# Patient Record
Sex: MALE | Race: WHITE
[De-identification: names, ages, dates, MRNs, and addresses within clinical notes are randomized per-mention and may not be internally consistent; named-entity substitution may affect disease eponyms.]

---

## 2019-04-02 ENCOUNTER — HOSPITAL ENCOUNTER (EMERGENCY)
Dept: HOSPITAL 47 - EC | Age: 24
Discharge: HOME | End: 2019-04-02
Payer: COMMERCIAL

## 2019-04-02 VITALS — HEART RATE: 82 BPM | RESPIRATION RATE: 16 BRPM | SYSTOLIC BLOOD PRESSURE: 127 MMHG | DIASTOLIC BLOOD PRESSURE: 77 MMHG

## 2019-04-02 VITALS — TEMPERATURE: 98.9 F
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
## 2019-04-02 DIAGNOSIS — S99.911A: Primary | ICD-10-CM
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
## 2019-04-02 PROCEDURE — 99283 EMERGENCY DEPT VISIT LOW MDM: CPT

## 2019-04-02 NOTE — XR
EXAM:

  XR Right Foot Complete, 3 or More Views

 

CLINICAL HISTORY:

  ITS.REASON XR Reason: Pain

 

TECHNIQUE:

  Frontal, lateral and oblique views of the right foot.

 

COMPARISON:

  None available

 

FINDINGS:

  Bones/joints:  No evidence of fracture or dislocation.  No significant 

arthritic changes.  No bony erosions identified.

  Soft tissues:  No radiopaque foreign bodies.

 

IMPRESSION:     

  No evidence of fracture or dislocation.

 

  No acute bone or joint abnormalities.

## 2019-04-02 NOTE — XR
EXAMINATION TYPE: XR ankle complete RT

 

DATE OF EXAM: 4/2/2019

 

COMPARISON: NONE

 

HISTORY: Twisted ankle. Pain

 

TECHNIQUE: 3 views

 

FINDINGS: There is soft tissue swelling over lateral malleolus. Ankle mortise is anatomic. Joint spac
es are normal.

 

IMPRESSION: Soft tissue swelling. No fracture seen.

## 2019-04-02 NOTE — ED
General Adult HPI





- General


Chief complaint: Extremity Injury, Lower


Stated complaint: Ankle injury


Time Seen by Provider: 04/02/19 21:48


Source: patient, RN notes reviewed


Mode of arrival: ambulatory


Limitations: no limitations





- History of Present Illness


Initial comments: 





23-year-old male presents for right ankle pain after twisting his ankle today.  

Patient states he accidentally inverted his right ankle when walking.  States it

is painful to walk on at this time.  Also admits to right forefoot pain.  Denies

any other injuries.  Denies hitting his head. Patient has no other complaints at

this time including shortness of breath, chest pain, abdominal pain, nausea or 

vomiting, headache, or visual changes.





- Related Data


                                Home Medications











 Medication  Instructions  Recorded  Confirmed


 


No Known Home Medications  04/02/19 04/02/19











                                    Allergies











Allergy/AdvReac Type Severity Reaction Status Date / Time


 


No Known Allergies Allergy   Verified 04/02/19 21:42














Review of Systems


ROS Statement: 


Those systems with pertinent positive or pertinent negative responses have been 

documented in the HPI.





ROS Other: All systems not noted in ROS Statement are negative.





Past Medical History


Past Medical History: No Reported History


History of Any Multi-Drug Resistant Organisms: None Reported


Past Surgical History: No Surgical Hx Reported


Past Psychological History: No Psychological Hx Reported


Smoking Status: Never smoker


Past Alcohol Use History: None Reported


Past Drug Use History: None Reported





General Exam


Limitations: no limitations


General appearance: alert, in no apparent distress


Head exam: Present: atraumatic, normocephalic, normal inspection


Eye exam: Present: normal appearance, PERRL, EOMI.  Absent: scleral icterus, 

conjunctival injection, periorbital swelling


ENT exam: Present: normal exam, normal oropharynx, mucous membranes moist, TM's 

normal bilaterally, normal external ear exam


Neck exam: Present: normal inspection, full ROM.  Absent: tenderness, 

meningismus, lymphadenopathy


Respiratory exam: Present: normal lung sounds bilaterally.  Absent: respiratory 

distress, wheezes, rales, rhonchi, stridor


Cardiovascular Exam: Present: regular rate, normal rhythm, normal heart sounds. 

Absent: systolic murmur, diastolic murmur, rubs, gallop, clicks


GI/Abdominal exam: Present: soft, normal bowel sounds.  Absent: distended, 

tenderness, guarding, rebound, rigid


Extremities exam: Present: tenderness (Tenderness over the generalized right 

forefoot as well as the lateral malleolus), normal capillary refill (Capillary 

refill less than 2 seconds, DP pulse 2+ in the right lower extremity), joint 

swelling (Patient does have edema noted of the right lateral malleolus), other 

(Sensation intact in the right lower extremity).  Absent: full ROM (Patient has 

limited plantar and dorsiflexion of the right foot but is able to do this 

partially)


Neurological exam: Present: alert, oriented X3, CN II-XII intact


Psychiatric exam: Present: normal affect, normal mood





Course


                                   Vital Signs











  04/02/19 04/02/19





  21:40 22:42


 


Temperature 98.9 F 


 


Pulse Rate 107 H 82


 


Respiratory 20 16





Rate  


 


Blood Pressure 133/78 127/77


 


O2 Sat by Pulse 99 97





Oximetry  














Procedures





- Orthopedic Splinting/Casting


  ** Injury #1


Side: right


Lower Extremity Injury Location: ankle


Lower Extremity Immobilizer: AirCast


Other Orthopedic Equipment: crutches


Additional Comments: 





Neurovascular status intact after splint applied





Medical Decision Making





- Medical Decision Making





23-year-old male presents to the emergency department for a chief complaint of 

right ankle pain.  Patient does have edema noted of the right lateral malleolus 

with minimal forefoot tenderness.  X-rays of the foot and ankle are negative for

fractures.  Patient was splinted in an Aircast.  Patient will follow up with 

orthopedics in one to 2 days and will return here if he has any worsening 

symptoms.  Educated on rice therapy and use of crutches.





Disposition


Clinical Impression: 


 Injury of ankle





Disposition: HOME SELF-CARE


Condition: Good


Instructions (If sedation given, give patient instructions):  Ankle Sprain (ED)


Additional Instructions: 


Take Motrin and Tylenol for pain.  Rest ice and elevate the right foot.  Use 

crutches.  Follow-up with orthopedics in one to 2 days.  Please return here to 

the emergency department if you have any worsening symptoms.


Is patient prescribed a controlled substance at d/c from ED?: No


Referrals: 


Kodi Salazar MD [REFERRING] - 1-2 days


Des Feliciano MD [STAFF PHYSICIAN] - 1-2 days


Time of Disposition: 23:15

## 2020-04-25 ENCOUNTER — HOSPITAL ENCOUNTER (EMERGENCY)
Dept: HOSPITAL 47 - EC | Age: 25
Discharge: HOME | End: 2020-04-25
Payer: COMMERCIAL

## 2020-04-25 VITALS
TEMPERATURE: 99.9 F | DIASTOLIC BLOOD PRESSURE: 76 MMHG | HEART RATE: 113 BPM | RESPIRATION RATE: 20 BRPM | SYSTOLIC BLOOD PRESSURE: 132 MMHG

## 2020-04-25 DIAGNOSIS — U07.1: Primary | ICD-10-CM

## 2020-04-25 DIAGNOSIS — R07.89: ICD-10-CM

## 2020-04-25 LAB
ALBUMIN SERPL-MCNC: 5.1 G/DL (ref 3.5–5)
ALP SERPL-CCNC: 82 U/L (ref 38–126)
ALT SERPL-CCNC: 31 U/L (ref 4–49)
ANION GAP SERPL CALC-SCNC: 11 MMOL/L
APTT BLD: 22.8 SEC (ref 22–30)
AST SERPL-CCNC: 28 U/L (ref 17–59)
BASOPHILS # BLD AUTO: 0 K/UL (ref 0–0.2)
BASOPHILS NFR BLD AUTO: 0 %
BUN SERPL-SCNC: 14 MG/DL (ref 9–20)
CALCIUM SPEC-MCNC: 10.4 MG/DL (ref 8.4–10.2)
CHLORIDE SERPL-SCNC: 101 MMOL/L (ref 98–107)
CO2 SERPL-SCNC: 29 MMOL/L (ref 22–30)
D DIMER PPP FEU-MCNC: <0.17 MG/L FEU (ref ?–0.6)
EOSINOPHIL # BLD AUTO: 0 K/UL (ref 0–0.7)
EOSINOPHIL NFR BLD AUTO: 1 %
ERYTHROCYTE [DISTWIDTH] IN BLOOD BY AUTOMATED COUNT: 5.39 M/UL (ref 4.3–5.9)
ERYTHROCYTE [DISTWIDTH] IN BLOOD: 12.1 % (ref 11.5–15.5)
GLUCOSE SERPL-MCNC: 111 MG/DL (ref 74–99)
HCT VFR BLD AUTO: 45.4 % (ref 39–53)
HGB BLD-MCNC: 15.3 GM/DL (ref 13–17.5)
INR PPP: 1.1 (ref ?–1.2)
LYMPHOCYTES # SPEC AUTO: 0.8 K/UL (ref 1–4.8)
LYMPHOCYTES NFR SPEC AUTO: 13 %
MAGNESIUM SPEC-SCNC: 2.2 MG/DL (ref 1.6–2.3)
MCH RBC QN AUTO: 28.3 PG (ref 25–35)
MCHC RBC AUTO-ENTMCNC: 33.7 G/DL (ref 31–37)
MCV RBC AUTO: 84.2 FL (ref 80–100)
MONOCYTES # BLD AUTO: 0.3 K/UL (ref 0–1)
MONOCYTES NFR BLD AUTO: 5 %
NEUTROPHILS # BLD AUTO: 4.9 K/UL (ref 1.3–7.7)
NEUTROPHILS NFR BLD AUTO: 80 %
PLATELET # BLD AUTO: 175 K/UL (ref 150–450)
POTASSIUM SERPL-SCNC: 3.9 MMOL/L (ref 3.5–5.1)
PROT SERPL-MCNC: 7.7 G/DL (ref 6.3–8.2)
PT BLD: 11.4 SEC (ref 9–12)
SODIUM SERPL-SCNC: 141 MMOL/L (ref 137–145)
WBC # BLD AUTO: 6.1 K/UL (ref 3.8–10.6)

## 2020-04-25 PROCEDURE — 85025 COMPLETE CBC W/AUTO DIFF WBC: CPT

## 2020-04-25 PROCEDURE — 83735 ASSAY OF MAGNESIUM: CPT

## 2020-04-25 PROCEDURE — 93005 ELECTROCARDIOGRAM TRACING: CPT

## 2020-04-25 PROCEDURE — 84484 ASSAY OF TROPONIN QUANT: CPT

## 2020-04-25 PROCEDURE — 36415 COLL VENOUS BLD VENIPUNCTURE: CPT

## 2020-04-25 PROCEDURE — 87635 SARS-COV-2 COVID-19 AMP PRB: CPT

## 2020-04-25 PROCEDURE — 85730 THROMBOPLASTIN TIME PARTIAL: CPT

## 2020-04-25 PROCEDURE — 85610 PROTHROMBIN TIME: CPT

## 2020-04-25 PROCEDURE — 85379 FIBRIN DEGRADATION QUANT: CPT

## 2020-04-25 PROCEDURE — 99285 EMERGENCY DEPT VISIT HI MDM: CPT

## 2020-04-25 PROCEDURE — 71045 X-RAY EXAM CHEST 1 VIEW: CPT

## 2020-04-25 PROCEDURE — 80053 COMPREHEN METABOLIC PANEL: CPT

## 2020-04-25 NOTE — XR
EXAMINATION TYPE: XR chest 1V portable

 

DATE OF EXAM: 4/25/2020

 

Comparison: None

 

Clinical History: 24-year-old male COUGH/SOB

 

Findings:

Heart normal size. Aorta and pulmonary vasculature within normal limits. Hazy peripheral midlung dens
ities related to overlying soft tissue. No consolidation or pleural effusion.

 

 

Impression:

 No acute cardiopulmonary process.

## 2020-04-25 NOTE — ED
URI HPI





- General


Chief Complaint: Upper Respiratory Infection


Stated Complaint: Chest pain


Time Seen by Provider: 04/25/20 16:04


Source: patient


Mode of arrival: ambulatory


Limitations: no limitations





- History of Present Illness


Initial Comments: 





Patient is a 24-year-old male presenting to the emergency Department with 

complaints of chest discomfort for 2 days.  Patient states he lives with his 

father who just had a positive for Covid.  Patient is also experiencing a fever,

shortness of breath.  He states his symptoms started 2 days ago.  He denies any 

abdominal pain, nausea, vomiting, diarrhea.  He states the chest pain is 

intermittent and increases when he is coughing.  He denies history of PE.  He 

has not been taking any Tylenol.  Patient states he took Robitussin this 

morning.  He has no other complaints.  Upon arrival to the ER, patient's 

temperatures 100.4, pulse is 137, respiratory rate 24, BP and oxygen are normal.





- Related Data


                                  Previous Rx's











 Medication  Instructions  Recorded


 


Azithromycin [Zithromax Z-pack] 250 mg PO AS DIRECTED #1 pack 04/27/20











                                    Allergies











Allergy/AdvReac Type Severity Reaction Status Date / Time


 


No Known Allergies Allergy   Verified 04/27/20 03:18














Review of Systems


ROS Statement: 


Those systems with pertinent positive or pertinent negative responses have been 

documented in the HPI.





ROS Other: All systems not noted in ROS Statement are negative.





Past Medical History


Past Medical History: No Reported History


History of Any Multi-Drug Resistant Organisms: None Reported


Past Surgical History: No Surgical Hx Reported


Past Psychological History: No Psychological Hx Reported


Smoking Status: Never smoker


Past Alcohol Use History: None Reported


Past Drug Use History: None Reported





General Exam





- General Exam Comments


Initial Comments: 





GENERAL: 


Well-appearing, well-nourished and in no acute distress.





HEAD: 


Atraumatic, normocephalic.





EYES:


Pupils equal round and reactive to light, extraocular movements intact, sclera 

anicteric, conjunctiva are normal.





ENT: 


TMs normal, nares patent, oropharynx clear without exudates.  Moist mucous 

membranes.





NECK: 


Normal range of motion, supple without lymphadenopathy or JVD.





LUNGS:


 Breath sounds clear to auscultation bilaterally and equal.  No wheezes rales or

rhonchi.





HEART:


Tachycardia rate and rhythm without murmurs, rubs or gallops.





ABDOMEN: 


Soft, nontender, normoactive bowel sounds.  No guarding, no rebound.  No masses 

appreciated.





: Deferred 





EXTREMITIES: 


Normal range of motion, no pitting or edema.  No clubbing or cyanosis.





NEUROLOGICAL: 


Cranial nerves II through XII grossly intact.  Normal speech, normal gait.





PSYCH:


Normal mood, normal affect.





SKIN:


 Warm, Dry, normal turgor, no rashes or lesions noted.


Limitations: no limitations





Course


                                   Vital Signs











  04/25/20 04/25/20 04/25/20





  15:09 15:44 16:56


 


Temperature 98.9 F 100.4 F H 


 


Pulse Rate 148 H 137 H 132 H


 


Respiratory 20 24 24





Rate   


 


Blood Pressure 140/80 137/89 141/63


 


O2 Sat by Pulse 100 98 98





Oximetry   














  04/25/20





  17:59


 


Temperature 99.9 F H


 


Pulse Rate 113 H


 


Respiratory 20





Rate 


 


Blood Pressure 132/76


 


O2 Sat by Pulse 98





Oximetry 














Medical Decision Making





- Medical Decision Making





Patient is 24-year-old male presenting with chest discomfort and fever 2 days. 

Patient is currently living with father who just has a positive for Covid.  

Patient presented febrile to 100.4, tachycardia at 137, respirations normal.  

EKG shows tachycardia, no other acute process.  Chest x-ray reveals no acute 

process.  Labs shows no acute findings, d-dimer was negative.  Troponin is 

normal.  Coronavirus was detected.  Patient was given Tylenol, fluids.  I discu

ssed these findings with the patient.  Patient is stable for discharge, he is 

feeling improvement, 98% on room air.  I discussed with patient to continue to 

self isolate, wear a mask.  Patient is in agreement with this plan of care.  

He'll continue with Tylenol as needed for fever control.  Return parameters were

discussed with the patient he verbalizes understanding.  Case discussed with Dr. Lindsay. 





- Lab Data


Result diagrams: 


                                 04/25/20 16:00





                                 04/25/20 16:00


                                   Lab Results











  04/25/20 04/25/20 04/25/20 Range/Units





  16:00 16:00 16:00 


 


WBC  6.1    (3.8-10.6)  k/uL


 


RBC  5.39    (4.30-5.90)  m/uL


 


Hgb  15.3    (13.0-17.5)  gm/dL


 


Hct  45.4    (39.0-53.0)  %


 


MCV  84.2    (80.0-100.0)  fL


 


MCH  28.3    (25.0-35.0)  pg


 


MCHC  33.7    (31.0-37.0)  g/dL


 


RDW  12.1    (11.5-15.5)  %


 


Plt Count  175    (150-450)  k/uL


 


Neutrophils %  80    %


 


Lymphocytes %  13    %


 


Monocytes %  5    %


 


Eosinophils %  1    %


 


Basophils %  0    %


 


Neutrophils #  4.9    (1.3-7.7)  k/uL


 


Lymphocytes #  0.8 L    (1.0-4.8)  k/uL


 


Monocytes #  0.3    (0-1.0)  k/uL


 


Eosinophils #  0.0    (0-0.7)  k/uL


 


Basophils #  0.0    (0-0.2)  k/uL


 


PT   11.4   (9.0-12.0)  sec


 


INR   1.1   (<1.2)  


 


APTT   22.8   (22.0-30.0)  sec


 


D-Dimer   <0.17   (<0.60)  mg/L FEU


 


Sodium    141  (137-145)  mmol/L


 


Potassium    3.9  (3.5-5.1)  mmol/L


 


Chloride    101  ()  mmol/L


 


Carbon Dioxide    29  (22-30)  mmol/L


 


Anion Gap    11  mmol/L


 


BUN    14  (9-20)  mg/dL


 


Creatinine    0.79  (0.66-1.25)  mg/dL


 


Est GFR (CKD-EPI)AfAm    >90  (>60 ml/min/1.73 sqM)  


 


Est GFR (CKD-EPI)NonAf    >90  (>60 ml/min/1.73 sqM)  


 


Glucose    111 H  (74-99)  mg/dL


 


Calcium    10.4 H  (8.4-10.2)  mg/dL


 


Magnesium    2.2  (1.6-2.3)  mg/dL


 


Total Bilirubin    0.5  (0.2-1.3)  mg/dL


 


AST    28  (17-59)  U/L


 


ALT    31  (4-49)  U/L


 


Alkaline Phosphatase    82  ()  U/L


 


Troponin I     (0.000-0.034)  ng/mL


 


Total Protein    7.7  (6.3-8.2)  g/dL


 


Albumin    5.1 H  (3.5-5.0)  g/dL


 


Coronavirus (PCR)     (Not Detectd)  














  04/25/20 04/25/20 Range/Units





  16:00 16:00 


 


WBC    (3.8-10.6)  k/uL


 


RBC    (4.30-5.90)  m/uL


 


Hgb    (13.0-17.5)  gm/dL


 


Hct    (39.0-53.0)  %


 


MCV    (80.0-100.0)  fL


 


MCH    (25.0-35.0)  pg


 


MCHC    (31.0-37.0)  g/dL


 


RDW    (11.5-15.5)  %


 


Plt Count    (150-450)  k/uL


 


Neutrophils %    %


 


Lymphocytes %    %


 


Monocytes %    %


 


Eosinophils %    %


 


Basophils %    %


 


Neutrophils #    (1.3-7.7)  k/uL


 


Lymphocytes #    (1.0-4.8)  k/uL


 


Monocytes #    (0-1.0)  k/uL


 


Eosinophils #    (0-0.7)  k/uL


 


Basophils #    (0-0.2)  k/uL


 


PT    (9.0-12.0)  sec


 


INR    (<1.2)  


 


APTT    (22.0-30.0)  sec


 


D-Dimer    (<0.60)  mg/L FEU


 


Sodium    (137-145)  mmol/L


 


Potassium    (3.5-5.1)  mmol/L


 


Chloride    ()  mmol/L


 


Carbon Dioxide    (22-30)  mmol/L


 


Anion Gap    mmol/L


 


BUN    (9-20)  mg/dL


 


Creatinine    (0.66-1.25)  mg/dL


 


Est GFR (CKD-EPI)AfAm    (>60 ml/min/1.73 sqM)  


 


Est GFR (CKD-EPI)NonAf    (>60 ml/min/1.73 sqM)  


 


Glucose    (74-99)  mg/dL


 


Calcium    (8.4-10.2)  mg/dL


 


Magnesium    (1.6-2.3)  mg/dL


 


Total Bilirubin    (0.2-1.3)  mg/dL


 


AST    (17-59)  U/L


 


ALT    (4-49)  U/L


 


Alkaline Phosphatase    ()  U/L


 


Troponin I  <0.012   (0.000-0.034)  ng/mL


 


Total Protein    (6.3-8.2)  g/dL


 


Albumin    (3.5-5.0)  g/dL


 


Coronavirus (PCR)   Detected A  (Not Detectd)  














- EKG Data


EKG Comments: 





Ventricular rate 119, MT interval 156, .  Sinus tachycardia, possible 

left atrial enlargement.  no signs of acute ischemia.





Disposition


Clinical Impression: 


 COVID-19 virus infection, Atypical chest pain





Disposition: HOME SELF-CARE


Condition: Stable


Instructions (If sedation given, give patient instructions):  Upper Respiratory 

Infection (ED)


Additional Instructions: 


Please return to the Emergency Department if symptoms worsen or any other 

concerns.


Continue with Tylenol as needed for fever control.  


Continue to increase fluid intake.


Continue to wear a mask at home.  Continue to self quarantine to prevent the 

virus spread.


Is patient prescribed a controlled substance at d/c from ED?: No


Referrals: 


None,Stated [Primary Care Provider] - 1-2 days

## 2020-04-27 ENCOUNTER — HOSPITAL ENCOUNTER (EMERGENCY)
Dept: HOSPITAL 47 - EC | Age: 25
Discharge: HOME | End: 2020-04-27
Payer: COMMERCIAL

## 2020-04-27 VITALS — SYSTOLIC BLOOD PRESSURE: 147 MMHG | TEMPERATURE: 98.6 F | DIASTOLIC BLOOD PRESSURE: 94 MMHG

## 2020-04-27 VITALS — HEART RATE: 93 BPM

## 2020-04-27 VITALS — RESPIRATION RATE: 18 BRPM

## 2020-04-27 DIAGNOSIS — R07.89: ICD-10-CM

## 2020-04-27 DIAGNOSIS — U07.1: Primary | ICD-10-CM

## 2020-04-27 LAB
ALBUMIN SERPL-MCNC: 5.2 G/DL (ref 3.5–5)
ALP SERPL-CCNC: 85 U/L (ref 38–126)
ALT SERPL-CCNC: 27 U/L (ref 4–49)
ANION GAP SERPL CALC-SCNC: 9 MMOL/L
AST SERPL-CCNC: 28 U/L (ref 17–59)
BASOPHILS # BLD AUTO: 0 K/UL (ref 0–0.2)
BASOPHILS NFR BLD AUTO: 0 %
BUN SERPL-SCNC: 9 MG/DL (ref 9–20)
CALCIUM SPEC-MCNC: 10 MG/DL (ref 8.4–10.2)
CHLORIDE SERPL-SCNC: 100 MMOL/L (ref 98–107)
CO2 SERPL-SCNC: 27 MMOL/L (ref 22–30)
EOSINOPHIL # BLD AUTO: 0.1 K/UL (ref 0–0.7)
EOSINOPHIL NFR BLD AUTO: 1 %
ERYTHROCYTE [DISTWIDTH] IN BLOOD BY AUTOMATED COUNT: 5.31 M/UL (ref 4.3–5.9)
ERYTHROCYTE [DISTWIDTH] IN BLOOD: 12 % (ref 11.5–15.5)
GLUCOSE SERPL-MCNC: 96 MG/DL (ref 74–99)
HCT VFR BLD AUTO: 43.5 % (ref 39–53)
HGB BLD-MCNC: 15 GM/DL (ref 13–17.5)
LYMPHOCYTES # SPEC AUTO: 1.4 K/UL (ref 1–4.8)
LYMPHOCYTES NFR SPEC AUTO: 25 %
MCH RBC QN AUTO: 28.1 PG (ref 25–35)
MCHC RBC AUTO-ENTMCNC: 34.3 G/DL (ref 31–37)
MCV RBC AUTO: 81.9 FL (ref 80–100)
MONOCYTES # BLD AUTO: 0.4 K/UL (ref 0–1)
MONOCYTES NFR BLD AUTO: 7 %
NEUTROPHILS # BLD AUTO: 3.6 K/UL (ref 1.3–7.7)
NEUTROPHILS NFR BLD AUTO: 64 %
PLATELET # BLD AUTO: 174 K/UL (ref 150–450)
POTASSIUM SERPL-SCNC: 3.6 MMOL/L (ref 3.5–5.1)
PROT SERPL-MCNC: 8.1 G/DL (ref 6.3–8.2)
SODIUM SERPL-SCNC: 136 MMOL/L (ref 137–145)
WBC # BLD AUTO: 5.6 K/UL (ref 3.8–10.6)

## 2020-04-27 PROCEDURE — 85379 FIBRIN DEGRADATION QUANT: CPT

## 2020-04-27 PROCEDURE — 99284 EMERGENCY DEPT VISIT MOD MDM: CPT

## 2020-04-27 PROCEDURE — 36415 COLL VENOUS BLD VENIPUNCTURE: CPT

## 2020-04-27 PROCEDURE — 80053 COMPREHEN METABOLIC PANEL: CPT

## 2020-04-27 PROCEDURE — 71045 X-RAY EXAM CHEST 1 VIEW: CPT

## 2020-04-27 PROCEDURE — 85025 COMPLETE CBC W/AUTO DIFF WBC: CPT

## 2020-04-27 PROCEDURE — 93005 ELECTROCARDIOGRAM TRACING: CPT

## 2020-04-27 NOTE — ED
General Adult HPI





- General


Chief complaint: Upper Respiratory Infection


Stated complaint: cough


Source: patient


Mode of arrival: ambulatory


Limitations: no limitations





- History of Present Illness


Initial comments: 





The patient is a 24-year-old male with no past medical history of presents to 

the emergency department with reported continued cough and chest pain.  The 

patient was seen in the emergency department 2 days ago for similar complaint 

and was diagnosed Covid.  He does look his father who was diagnosed positive.  

He states that he has been taking 1000 mg of Tylenol every 6 hours for his 

fever.  He is also taken Robitussin.  He reports to having continued body aches,

sore throat and cough.  He denies any worsening shortness of breath.  He did 

have an episode of coughing today for which she had some blood streaking in his 

mucus.  He denies a history of DVT or PE.  No calf pain or swelling.  He denies 

any nausea or vomiting.  No abdominal pain.  There are no other alleviating, 

precipitating or modifying factors





- Related Data


                                  Previous Rx's











 Medication  Instructions  Recorded


 


Azithromycin [Zithromax Z-pack] 250 mg PO AS DIRECTED #1 pack 04/27/20











                                    Allergies











Allergy/AdvReac Type Severity Reaction Status Date / Time


 


No Known Allergies Allergy   Verified 04/27/20 03:18














Review of Systems


ROS Statement: 


Those systems with pertinent positive or pertinent negative responses have been 

documented in the HPI.





ROS Other: All systems not noted in ROS Statement are negative.





Past Medical History


Past Medical History: No Reported History


History of Any Multi-Drug Resistant Organisms: None Reported


Past Surgical History: No Surgical Hx Reported


Past Psychological History: No Psychological Hx Reported


Smoking Status: Never smoker


Past Alcohol Use History: None Reported


Past Drug Use History: None Reported





General Exam


Limitations: no limitations





Course


                                   Vital Signs











  04/27/20 04/27/20 04/27/20





  03:13 03:42 05:07


 


Temperature 98.6 F  


 


Pulse Rate 120 H  93


 


Respiratory 20 18 18





Rate   


 


Blood Pressure 147/94  


 


O2 Sat by Pulse 100  98





Oximetry   














EKG Findings





- EKG Comments:


EKG Findings:: EKG demonstrates a normal sinus rhythm with a ventricular rate of

98.  W[place] interval 136.  QRS 82.  QTC of 434.  No acute ST segment elevations or 

depressions concerning for ischemic changes





Medical Decision Making





- Medical Decision Making





Upon the patient's placed into room 7.  The patient is tachycardic however he is

oxygenating percent on room air.  No increased work of breathing.  I did 

recommend repeating laboratory studies and a chest x-ray for which the patient 

did agree.  Hemoglobin is 15.  D-dimer is less than 0.17.  Chest x-ray 

demonstrates no acute process.  I discussed results with the patient.  He is 

reevaluated and his tachycardia has improved.  He continues to saturate 97% with

no increased worker breathing.  I discussed diagnosis, differential treatment 

options.  The patient was given a dose of azithromycin in the emergency room.  I

will prescribe the patient azithromycin at home.  He is to follow up with his 

primary care doctor for further management.  Return to the emergency department 

for any new worsening symptoms.  The patient was in agreement treatment plan and

discharged home stable condition





- Lab Data


Result diagrams: 


                                 04/27/20 03:38





                                 04/27/20 03:38


                                   Lab Results











  04/27/20 04/27/20 04/27/20 Range/Units





  03:38 03:38 03:38 


 


WBC  5.6    (3.8-10.6)  k/uL


 


RBC  5.31    (4.30-5.90)  m/uL


 


Hgb  15.0    (13.0-17.5)  gm/dL


 


Hct  43.5    (39.0-53.0)  %


 


MCV  81.9    (80.0-100.0)  fL


 


MCH  28.1    (25.0-35.0)  pg


 


MCHC  34.3    (31.0-37.0)  g/dL


 


RDW  12.0    (11.5-15.5)  %


 


Plt Count  174    (150-450)  k/uL


 


Neutrophils %  64    %


 


Lymphocytes %  25    %


 


Monocytes %  7    %


 


Eosinophils %  1    %


 


Basophils %  0    %


 


Neutrophils #  3.6    (1.3-7.7)  k/uL


 


Lymphocytes #  1.4    (1.0-4.8)  k/uL


 


Monocytes #  0.4    (0-1.0)  k/uL


 


Eosinophils #  0.1    (0-0.7)  k/uL


 


Basophils #  0.0    (0-0.2)  k/uL


 


D-Dimer   <0.17   (<0.60)  mg/L FEU


 


Sodium    136 L  (137-145)  mmol/L


 


Potassium    3.6  (3.5-5.1)  mmol/L


 


Chloride    100  ()  mmol/L


 


Carbon Dioxide    27  (22-30)  mmol/L


 


Anion Gap    9  mmol/L


 


BUN    9  (9-20)  mg/dL


 


Creatinine    0.74  (0.66-1.25)  mg/dL


 


Est GFR (CKD-EPI)AfAm    >90  (>60 ml/min/1.73 sqM)  


 


Est GFR (CKD-EPI)NonAf    >90  (>60 ml/min/1.73 sqM)  


 


Glucose    96  (74-99)  mg/dL


 


Calcium    10.0  (8.4-10.2)  mg/dL


 


Total Bilirubin    0.7  (0.2-1.3)  mg/dL


 


AST    28  (17-59)  U/L


 


ALT    27  (4-49)  U/L


 


Alkaline Phosphatase    85  ()  U/L


 


Total Protein    8.1  (6.3-8.2)  g/dL


 


Albumin    5.2 H  (3.5-5.0)  g/dL














Disposition


Clinical Impression: 


 COVID-19 virus infection, Atypical chest pain





Disposition: HOME SELF-CARE


Condition: Stable


Instructions (If sedation given, give patient instructions):  Noncardiac Chest 

Pain (ED)


Additional Instructions: 


Please follow-up with your primary care doctor in 2-4 days.  Return to the 

emergency room for any new or worsening symptoms.  I do recommend you take your 

pulse ox daily.  Quarantine yourself in order to prevent the spread of the 

infection


Prescriptions: 


Azithromycin [Zithromax Z-pack] 250 mg PO AS DIRECTED #1 pack


Is patient prescribed a controlled substance at d/c from ED?: No


Referrals: 


None,Stated [Primary Care Provider] - 1-2 days


Time of Disposition: 05:02

## 2020-04-27 NOTE — XR
EXAMINATION TYPE: XR chest 1V portable

 

DATE OF EXAM: 4/27/2020

 

COMPARISON: 4/25/2020

 

HISTORY: Chest pain

 

TECHNIQUE: Single view

 

FINDINGS: Heart and mediastinum are normal. Lungs are clear. Diaphragm is normal. Bony thorax appears
 normal.

 

IMPRESSION: Normal chest. No change.

## 2020-05-04 ENCOUNTER — HOSPITAL ENCOUNTER (EMERGENCY)
Dept: HOSPITAL 47 - EC | Age: 25
Discharge: HOME | End: 2020-05-04
Payer: COMMERCIAL

## 2020-05-04 VITALS
SYSTOLIC BLOOD PRESSURE: 136 MMHG | RESPIRATION RATE: 18 BRPM | TEMPERATURE: 98.3 F | DIASTOLIC BLOOD PRESSURE: 79 MMHG | HEART RATE: 90 BPM

## 2020-05-04 DIAGNOSIS — U07.1: Primary | ICD-10-CM

## 2020-05-04 DIAGNOSIS — R07.89: ICD-10-CM

## 2020-05-04 LAB
ALBUMIN SERPL-MCNC: 5.2 G/DL (ref 3.5–5)
ALP SERPL-CCNC: 73 U/L (ref 38–126)
ALT SERPL-CCNC: 20 U/L (ref 4–49)
ANION GAP SERPL CALC-SCNC: 10 MMOL/L
AST SERPL-CCNC: 29 U/L (ref 17–59)
BASOPHILS # BLD AUTO: 0 K/UL (ref 0–0.2)
BASOPHILS NFR BLD AUTO: 0 %
BUN SERPL-SCNC: 10 MG/DL (ref 9–20)
CALCIUM SPEC-MCNC: 10.2 MG/DL (ref 8.4–10.2)
CHLORIDE SERPL-SCNC: 99 MMOL/L (ref 98–107)
CO2 SERPL-SCNC: 30 MMOL/L (ref 22–30)
EOSINOPHIL # BLD AUTO: 0.1 K/UL (ref 0–0.7)
EOSINOPHIL NFR BLD AUTO: 2 %
ERYTHROCYTE [DISTWIDTH] IN BLOOD BY AUTOMATED COUNT: 5.31 M/UL (ref 4.3–5.9)
ERYTHROCYTE [DISTWIDTH] IN BLOOD: 12.1 % (ref 11.5–15.5)
GLUCOSE SERPL-MCNC: 113 MG/DL (ref 74–99)
HCT VFR BLD AUTO: 44.6 % (ref 39–53)
HGB BLD-MCNC: 14.9 GM/DL (ref 13–17.5)
LYMPHOCYTES # SPEC AUTO: 1 K/UL (ref 1–4.8)
LYMPHOCYTES NFR SPEC AUTO: 22 %
MCH RBC QN AUTO: 28.1 PG (ref 25–35)
MCHC RBC AUTO-ENTMCNC: 33.5 G/DL (ref 31–37)
MCV RBC AUTO: 84 FL (ref 80–100)
MONOCYTES # BLD AUTO: 0.3 K/UL (ref 0–1)
MONOCYTES NFR BLD AUTO: 6 %
NEUTROPHILS # BLD AUTO: 3.2 K/UL (ref 1.3–7.7)
NEUTROPHILS NFR BLD AUTO: 68 %
PLATELET # BLD AUTO: 174 K/UL (ref 150–450)
POTASSIUM SERPL-SCNC: 4 MMOL/L (ref 3.5–5.1)
PROT SERPL-MCNC: 7.8 G/DL (ref 6.3–8.2)
SODIUM SERPL-SCNC: 139 MMOL/L (ref 137–145)
WBC # BLD AUTO: 4.7 K/UL (ref 3.8–10.6)

## 2020-05-04 PROCEDURE — 99285 EMERGENCY DEPT VISIT HI MDM: CPT

## 2020-05-04 PROCEDURE — 71045 X-RAY EXAM CHEST 1 VIEW: CPT

## 2020-05-04 PROCEDURE — 36415 COLL VENOUS BLD VENIPUNCTURE: CPT

## 2020-05-04 PROCEDURE — 85025 COMPLETE CBC W/AUTO DIFF WBC: CPT

## 2020-05-04 PROCEDURE — 80053 COMPREHEN METABOLIC PANEL: CPT

## 2020-05-04 PROCEDURE — 96374 THER/PROPH/DIAG INJ IV PUSH: CPT

## 2020-05-04 NOTE — XR
EXAMINATION TYPE: XR chest 1V portable

 

DATE OF EXAM: 5/4/2020

 

COMPARISON: 4/27/2020

 

HISTORY: Chest pain

 

TECHNIQUE: Single frontal view of the chest is obtained.

 

FINDINGS:  

There is no focal air space opacity, pleural effusion, or pneumothorax seen.  

The cardiac silhouette size is within normal limits.   

The osseous structures are intact.

 

IMPRESSION:  

1.  No acute process.

## 2020-05-04 NOTE — ED
General Adult HPI





- General


Source: patient


Mode of arrival: ambulatory


Limitations: no limitations





<Odilon Rivas - Last Filed: 05/04/20 19:18>





<Germaine Contreras - Last Filed: 05/06/20 12:04>





- General


Chief complaint: Shortness of Breath


Stated complaint: chest pain


Time Seen by Provider: 05/04/20 14:39





- History of Present Illness


Initial comments: 





Patient is a 24-year-old male presenting to emergency Department with a chief 

complaint of cough and chest congestion.  Patient was diagnosed with coronavirus

a days ago.  Patient reports he didn't ED multiple times.  Patient states he has

been reading online and is not concerned for a possible side effects of 

coronavirus.  Patient states he has anxiety and states he now has developed 

chest pain.  Patient reports the chest pain was present since he was last seen 

in the ED.  Patient states he is also continuing to have "stomach rumbling".  

Denies any nausea vomiting diarrhea.  Patient denies any fevers at home.  States

the cough is nonproductive.  Denies any rhinorrhea or sore throat.  Reports 

taking Tylenol with no other medication.  Patient states he has been attempting 

to self quarantine in order to prevent any spread. (Odilon Rivas)





- Related Data


                                  Previous Rx's











 Medication  Instructions  Recorded


 


Azithromycin [Zithromax Z-pack] 250 mg PO AS DIRECTED #1 pack 04/27/20











                                    Allergies











Allergy/AdvReac Type Severity Reaction Status Date / Time


 


No Known Allergies Allergy   Verified 05/04/20 14:17














Review of Systems


ROS Other: All systems not noted in ROS Statement are negative.





<Odilon Rivas - Last Filed: 05/04/20 19:18>


ROS Other: All systems not noted in ROS Statement are negative.





<Germaine Contreras - Last Filed: 05/06/20 12:04>


ROS Statement: 


Those systems with pertinent positive or pertinent negative responses have been 

documented in the HPI.








Past Medical History


Past Medical History: No Reported History


Additional Past Medical History / Comment(s): covid 19 4/25/2020


History of Any Multi-Drug Resistant Organisms: None Reported


Past Surgical History: No Surgical Hx Reported


Past Psychological History: No Psychological Hx Reported


Smoking Status: Never smoker


Past Alcohol Use History: None Reported


Past Drug Use History: None Reported





<Odilon Rivas - Last Filed: 05/04/20 19:18>





General Exam


Limitations: no limitations


General appearance: alert, in no apparent distress


Head exam: Present: atraumatic, normocephalic, normal inspection


Eye exam: Present: normal appearance, PERRL, EOMI


Pupils: Present: normal accommodation


ENT exam: Present: normal exam, normal oropharynx, mucous membranes moist, TM's 

normal bilaterally, normal external ear exam


Neck exam: Present: normal inspection, full ROM


Respiratory exam: Present: normal lung sounds bilaterally.  Absent: respiratory 

distress, wheezes, rhonchi, stridor


Cardiovascular Exam: Present: regular rate, normal rhythm, normal heart sounds


Extremities exam: Present: normal inspection, full ROM


Back exam: Present: normal inspection, full ROM


Neurological exam: Present: alert, oriented X3


Psychiatric exam: Present: normal affect, normal mood


Skin exam: Present: warm, dry, intact, normal color





<Odilon Rivas - Last Filed: 05/04/20 19:18>





Course


                                   Vital Signs











  05/04/20 05/04/20





  14:13 17:10


 


Temperature 98.8 F 98.3 F


 


Pulse Rate 94 90


 


Respiratory 20 18





Rate  


 


Blood Pressure 147/75 136/79


 


O2 Sat by Pulse 99 99





Oximetry  














Medical Decision Making





- Lab Data


Result diagrams: 


                                 05/04/20 14:55





                                 05/04/20 14:55





<Odilon Rivas - Last Filed: 05/04/20 19:18>





- Lab Data


Result diagrams: 


                                 05/04/20 14:55





                                 05/04/20 14:55





<Germaine Contreras - Last Filed: 05/06/20 12:04>





- Medical Decision Making





Patient is a 24-year-old male presenting to the emergency department with a ch

ief complaint of cough and chest congestion.  Patient is currently on day 8 of 

coronavirus diagnosis.  Since his last ED appointmentin significant changes have

occurred.  Patient states she has increased anxiety because he continues to 

renal line and is not concerned for complications from the virus.  Patient was 

given Pepcid in the ED for any stomach discomfort.  He states it might be 

related to a lesser reflux.  Chest x-ray is unremarkable.  CBC CMP are 

unremarkable.  Patient is otherwise well-appearing.  Vitals are stable.  Patient

advised to follow with primary care.  Return parameters several discussed the 

patient was up standing and agreeable.  Case discussed with physician. 

(Odilon Rivas)


I was available for consultation in the emergency department.  The history and 

physical exam were done by the midlevel provider. I was consulted for this 

patients care. I reviewed the case with the midlevel provider and based on 

their presentation of the patient, I agree with the assessment, medical decision

making and plan of care as documented. Patient's vitals signs stable upon ED 

evaluation and patient appeared non-toxic. Patient instructed to follow up with 

PCP within 1-2 days for continued monitoring as the short and long term effects 

of corona virus are not known at this time. Patient understood this and he was 

discharged home in stable condition. 


Chart was dictated using Dragon dictation software.  Attempts were made to 

correct any dictation errors however some typographical errors may persist. 


Patient was seen during a national state of emergency due to the Covid-19 

pandemic. 


 (Germaine Contreras)





- Lab Data


                                   Lab Results











  05/04/20 05/04/20 Range/Units





  14:55 14:55 


 


WBC  4.7   (3.8-10.6)  k/uL


 


RBC  5.31   (4.30-5.90)  m/uL


 


Hgb  14.9   (13.0-17.5)  gm/dL


 


Hct  44.6   (39.0-53.0)  %


 


MCV  84.0   (80.0-100.0)  fL


 


MCH  28.1   (25.0-35.0)  pg


 


MCHC  33.5   (31.0-37.0)  g/dL


 


RDW  12.1   (11.5-15.5)  %


 


Plt Count  174   (150-450)  k/uL


 


Neutrophils %  68   %


 


Lymphocytes %  22   %


 


Monocytes %  6   %


 


Eosinophils %  2   %


 


Basophils %  0   %


 


Neutrophils #  3.2   (1.3-7.7)  k/uL


 


Lymphocytes #  1.0   (1.0-4.8)  k/uL


 


Monocytes #  0.3   (0-1.0)  k/uL


 


Eosinophils #  0.1   (0-0.7)  k/uL


 


Basophils #  0.0   (0-0.2)  k/uL


 


Sodium   139  (137-145)  mmol/L


 


Potassium   4.0  (3.5-5.1)  mmol/L


 


Chloride   99  ()  mmol/L


 


Carbon Dioxide   30  (22-30)  mmol/L


 


Anion Gap   10  mmol/L


 


BUN   10  (9-20)  mg/dL


 


Creatinine   0.73  (0.66-1.25)  mg/dL


 


Est GFR (CKD-EPI)AfAm   >90  (>60 ml/min/1.73 sqM)  


 


Est GFR (CKD-EPI)NonAf   >90  (>60 ml/min/1.73 sqM)  


 


Glucose   113 H  (74-99)  mg/dL


 


Calcium   10.2  (8.4-10.2)  mg/dL


 


Total Bilirubin   0.6  (0.2-1.3)  mg/dL


 


AST   29  (17-59)  U/L


 


ALT   20  (4-49)  U/L


 


Alkaline Phosphatase   73  ()  U/L


 


Total Protein   7.8  (6.3-8.2)  g/dL


 


Albumin   5.2 H  (3.5-5.0)  g/dL














Disposition


Is patient prescribed a controlled substance at d/c from ED?: No


Time of Disposition: 16:16





<Odilon Rivas - Last Filed: 05/04/20 19:18>





<Germaine Contreras - Last Filed: 05/06/20 12:04>


Clinical Impression: 


 COVID-19 virus infection, Atypical chest pain, Cough





Disposition: HOME SELF-CARE


Condition: Stable


Instructions (If sedation given, give patient instructions):  Acute Bronchitis 

(ED)


Additional Instructions: 


Continue taking Tylenol.  Drink lots of fluids.  Return to emergency department 

if symptoms worsen.


Referrals: 


None,Stated [Primary Care Provider] - 1-2 days

## 2020-06-01 ENCOUNTER — HOSPITAL ENCOUNTER (EMERGENCY)
Dept: HOSPITAL 47 - EC | Age: 25
Discharge: HOME | End: 2020-06-01
Payer: COMMERCIAL

## 2020-06-01 VITALS — SYSTOLIC BLOOD PRESSURE: 132 MMHG | DIASTOLIC BLOOD PRESSURE: 88 MMHG | TEMPERATURE: 98.7 F | HEART RATE: 92 BPM

## 2020-06-01 VITALS
TEMPERATURE: 98 F | HEART RATE: 105 BPM | SYSTOLIC BLOOD PRESSURE: 125 MMHG | DIASTOLIC BLOOD PRESSURE: 78 MMHG | RESPIRATION RATE: 18 BRPM

## 2020-06-01 VITALS — RESPIRATION RATE: 16 BRPM

## 2020-06-01 DIAGNOSIS — Z20.828: ICD-10-CM

## 2020-06-01 DIAGNOSIS — J02.9: ICD-10-CM

## 2020-06-01 DIAGNOSIS — R07.9: ICD-10-CM

## 2020-06-01 DIAGNOSIS — R07.9: Primary | ICD-10-CM

## 2020-06-01 DIAGNOSIS — F41.9: Primary | ICD-10-CM

## 2020-06-01 DIAGNOSIS — Z87.891: ICD-10-CM

## 2020-06-01 DIAGNOSIS — R11.10: ICD-10-CM

## 2020-06-01 DIAGNOSIS — F41.9: ICD-10-CM

## 2020-06-01 LAB
ALBUMIN SERPL-MCNC: 5.4 G/DL (ref 3.5–5)
ALP SERPL-CCNC: 78 U/L (ref 38–126)
ALT SERPL-CCNC: 20 U/L (ref 4–49)
ANION GAP SERPL CALC-SCNC: 13 MMOL/L
AST SERPL-CCNC: 30 U/L (ref 17–59)
BASOPHILS # BLD AUTO: 0 K/UL (ref 0–0.2)
BASOPHILS NFR BLD AUTO: 0 %
BUN SERPL-SCNC: 13 MG/DL (ref 9–20)
CALCIUM SPEC-MCNC: 10.1 MG/DL (ref 8.4–10.2)
CHLORIDE SERPL-SCNC: 100 MMOL/L (ref 98–107)
CO2 SERPL-SCNC: 25 MMOL/L (ref 22–30)
EOSINOPHIL # BLD AUTO: 0.1 K/UL (ref 0–0.7)
EOSINOPHIL NFR BLD AUTO: 2 %
ERYTHROCYTE [DISTWIDTH] IN BLOOD BY AUTOMATED COUNT: 5.13 M/UL (ref 4.3–5.9)
ERYTHROCYTE [DISTWIDTH] IN BLOOD: 12.2 % (ref 11.5–15.5)
GLUCOSE SERPL-MCNC: 125 MG/DL (ref 74–99)
HCT VFR BLD AUTO: 42.2 % (ref 39–53)
HGB BLD-MCNC: 15.1 GM/DL (ref 13–17.5)
LYMPHOCYTES # SPEC AUTO: 1.2 K/UL (ref 1–4.8)
LYMPHOCYTES NFR SPEC AUTO: 22 %
MCH RBC QN AUTO: 29.5 PG (ref 25–35)
MCHC RBC AUTO-ENTMCNC: 35.8 G/DL (ref 31–37)
MCV RBC AUTO: 82.2 FL (ref 80–100)
MONOCYTES # BLD AUTO: 0.4 K/UL (ref 0–1)
MONOCYTES NFR BLD AUTO: 7 %
NEUTROPHILS # BLD AUTO: 3.8 K/UL (ref 1.3–7.7)
NEUTROPHILS NFR BLD AUTO: 67 %
PLATELET # BLD AUTO: 151 K/UL (ref 150–450)
POTASSIUM SERPL-SCNC: 3.6 MMOL/L (ref 3.5–5.1)
PROT SERPL-MCNC: 8.3 G/DL (ref 6.3–8.2)
SODIUM SERPL-SCNC: 138 MMOL/L (ref 137–145)
WBC # BLD AUTO: 5.7 K/UL (ref 3.8–10.6)

## 2020-06-01 PROCEDURE — 71046 X-RAY EXAM CHEST 2 VIEWS: CPT

## 2020-06-01 PROCEDURE — 36415 COLL VENOUS BLD VENIPUNCTURE: CPT

## 2020-06-01 PROCEDURE — 87081 CULTURE SCREEN ONLY: CPT

## 2020-06-01 PROCEDURE — 80053 COMPREHEN METABOLIC PANEL: CPT

## 2020-06-01 PROCEDURE — 85379 FIBRIN DEGRADATION QUANT: CPT

## 2020-06-01 PROCEDURE — 99285 EMERGENCY DEPT VISIT HI MDM: CPT

## 2020-06-01 PROCEDURE — 96372 THER/PROPH/DIAG INJ SC/IM: CPT

## 2020-06-01 PROCEDURE — 84484 ASSAY OF TROPONIN QUANT: CPT

## 2020-06-01 PROCEDURE — 85025 COMPLETE CBC W/AUTO DIFF WBC: CPT

## 2020-06-01 PROCEDURE — 87430 STREP A AG IA: CPT

## 2020-06-01 PROCEDURE — 93005 ELECTROCARDIOGRAM TRACING: CPT

## 2020-06-01 NOTE — ED
ENT HPI





- General


Chief complaint: ENT


Stated complaint: Shaky,Chest Pain


Time Seen by Provider: 06/01/20 01:46


Source: patient


Mode of arrival: ambulatory


Limitations: no limitations





- History of Present Illness


Initial comments: 


23yo male with history of Covid-19 + 4/25/2020 presenting to the ER for 

persistent symptoms. Patient states he has had fevers, chest pain since his 

diagnosis. He states he has been fever free for the past 3 days. Patient states 

that he now has had sore throat. He states that he has had cough that has 

improved, and he has had chest pain since his diagnosis. He denies any specific 

aggravating factors. But states he thinks it feels slightly better with sitting 

up. Patient denies leg swelling, hemoptysis. Admit to nausea, denies vomiting, 

diarrhea. Patient states he has had extreme anxiety related to the infection 

especially since his father passed away on 5/11 from Covid-19. Patient appears 

well on anxious on arrival, he is afebrile. 








- Related Data


                                  Previous Rx's











 Medication  Instructions  Recorded


 


Azithromycin [Zithromax Z-pack] 250 mg PO AS DIRECTED #1 pack 04/27/20


 


LORazepam [Ativan] 0.5 mg PO TID PRN 3 Days #5 tab 06/01/20











                                    Allergies











Allergy/AdvReac Type Severity Reaction Status Date / Time


 


No Known Allergies Allergy   Verified 06/01/20 20:21














Review of Systems


ROS Statement: 


Those systems with pertinent positive or pertinent negative responses have been 

documented in the HPI.





ROS Other: All systems not noted in ROS Statement are negative.





Past Medical History


Past Medical History: No Reported History


Additional Past Medical History / Comment(s): covid 19 4/25/2020


History of Any Multi-Drug Resistant Organisms: None Reported


Past Surgical History: No Surgical Hx Reported


Past Psychological History: No Psychological Hx Reported


Smoking Status: Never smoker


Past Alcohol Use History: None Reported


Past Drug Use History: None Reported





General Exam





- General Exam Comments


Initial Comments: 


General:  The patient is awake and alert, appears anxious


Eye:  +3 mm pupils are equal, round and reactive to light, extra-ocular 

movements are intact.  No nystagmus.  There is normal conjunctiva bilaterally.  

No signs of icterus.  No photophobia


Ears, nose, mouth and throat:  There are moist mucous membranes and no oral 

lesions.  Oropharynx was not erythematous there is no tonsillar enlargement 

exudates or lesions.  Uvula midline.  Tympanic membranes are not erythematous or

 is no effusions bulging or retraction.  No tenderness to palpation of the 

mastoid.  No anterior cervical lymphadenopathy.  Rhinorrhea, clear and bilateral

 nares.  No tripoding, no drooling.


Neck:  The neck is supple, there is no tenderness or JVD.  No nuchal rigidity 

negative Brudzinski and Kernig


Cardiovascular:  There is a regular rate and rhythm. No murmur, rub or gallop is

 appreciated.


Respiratory:  Lungs are clear to auscultation, respirations are non-labored, lincoln

ath sounds are equal.  No wheezes, stridor, rales, or rhonchi.  No retractions 

or abdominal breathing.


Gastrointestinal:  Soft, non-distended, non-tender abdomen without masses or 

organomegaly noted. There is no rebound or guarding present.  Bowel sounds are 

unremarkable.


Musculoskeletal:  Normal ROM, no tenderness.  Strength 5/5. Sensation intact. 

Radial pulses equal bilaterally 2+.  


Neurological:  A&O x 3. CN II-XII intact grossly, There are no obvious motor or 

sensory deficits. Coordination appears grossly intact. Speech appears normal, no

 muffling.


Skin:  Skin is warm and dry and no rashes or lesions are noted.  No extremity 

edema


Psychiatric:  Cooperative





Limitations: no limitations





Course


                                   Vital Signs











  06/01/20 06/01/20 06/01/20





  01:43 03:10 03:47


 


Temperature 98.4 F  98.7 F


 


Pulse Rate 118 H 102 H 92


 


Respiratory 20 16 16





Rate   


 


Blood Pressure 141/92  132/88


 


O2 Sat by Pulse 100  100





Oximetry   














- Reevaluation(s)


Reevaluation #1: 


Patient case signed out to Dr. Castro--pending dimer/CMP/troponin. My current 

impression is that this is anxiety related chest pain although cannot make final

 impression/plan without complete laboratory studies.


06/01/20 02:54








Medical Decision Making





- Lab Data


Result diagrams: 


                                 06/01/20 02:15





                                 06/01/20 02:15


                                   Lab Results











  06/01/20 06/01/20 06/01/20 Range/Units





  02:15 02:15 02:15 


 


WBC  5.7    (3.8-10.6)  k/uL


 


RBC  5.13    (4.30-5.90)  m/uL


 


Hgb  15.1    (13.0-17.5)  gm/dL


 


Hct  42.2    (39.0-53.0)  %


 


MCV  82.2    (80.0-100.0)  fL


 


MCH  29.5    (25.0-35.0)  pg


 


MCHC  35.8    (31.0-37.0)  g/dL


 


RDW  12.2    (11.5-15.5)  %


 


Plt Count  151    (150-450)  k/uL


 


Neutrophils %  67    %


 


Lymphocytes %  22    %


 


Monocytes %  7    %


 


Eosinophils %  2    %


 


Basophils %  0    %


 


Neutrophils #  3.8    (1.3-7.7)  k/uL


 


Lymphocytes #  1.2    (1.0-4.8)  k/uL


 


Monocytes #  0.4    (0-1.0)  k/uL


 


Eosinophils #  0.1    (0-0.7)  k/uL


 


Basophils #  0.0    (0-0.2)  k/uL


 


D-Dimer     (<0.60)  mg/L FEU


 


Sodium   138   (137-145)  mmol/L


 


Potassium   3.6   (3.5-5.1)  mmol/L


 


Chloride   100   ()  mmol/L


 


Carbon Dioxide   25   (22-30)  mmol/L


 


Anion Gap   13   mmol/L


 


BUN   13   (9-20)  mg/dL


 


Creatinine   0.76   (0.66-1.25)  mg/dL


 


Est GFR (CKD-EPI)AfAm   >90   (>60 ml/min/1.73 sqM)  


 


Est GFR (CKD-EPI)NonAf   >90   (>60 ml/min/1.73 sqM)  


 


Glucose   125 H   (74-99)  mg/dL


 


Calcium   10.1   (8.4-10.2)  mg/dL


 


Total Bilirubin   0.7   (0.2-1.3)  mg/dL


 


AST   30   (17-59)  U/L


 


ALT   20   (4-49)  U/L


 


Alkaline Phosphatase   78   ()  U/L


 


Troponin I    <0.012  (0.000-0.034)  ng/mL


 


Total Protein   8.3 H   (6.3-8.2)  g/dL


 


Albumin   5.4 H   (3.5-5.0)  g/dL


 


Coronavirus (PCR)     (Not Detected)  














  06/01/20 06/01/20 Range/Units





  02:18 02:18 


 


WBC    (3.8-10.6)  k/uL


 


RBC    (4.30-5.90)  m/uL


 


Hgb    (13.0-17.5)  gm/dL


 


Hct    (39.0-53.0)  %


 


MCV    (80.0-100.0)  fL


 


MCH    (25.0-35.0)  pg


 


MCHC    (31.0-37.0)  g/dL


 


RDW    (11.5-15.5)  %


 


Plt Count    (150-450)  k/uL


 


Neutrophils %    %


 


Lymphocytes %    %


 


Monocytes %    %


 


Eosinophils %    %


 


Basophils %    %


 


Neutrophils #    (1.3-7.7)  k/uL


 


Lymphocytes #    (1.0-4.8)  k/uL


 


Monocytes #    (0-1.0)  k/uL


 


Eosinophils #    (0-0.7)  k/uL


 


Basophils #    (0-0.2)  k/uL


 


D-Dimer   <0.17  (<0.60)  mg/L FEU


 


Sodium    (137-145)  mmol/L


 


Potassium    (3.5-5.1)  mmol/L


 


Chloride    ()  mmol/L


 


Carbon Dioxide    (22-30)  mmol/L


 


Anion Gap    mmol/L


 


BUN    (9-20)  mg/dL


 


Creatinine    (0.66-1.25)  mg/dL


 


Est GFR (CKD-EPI)AfAm    (>60 ml/min/1.73 sqM)  


 


Est GFR (CKD-EPI)NonAf    (>60 ml/min/1.73 sqM)  


 


Glucose    (74-99)  mg/dL


 


Calcium    (8.4-10.2)  mg/dL


 


Total Bilirubin    (0.2-1.3)  mg/dL


 


AST    (17-59)  U/L


 


ALT    (4-49)  U/L


 


Alkaline Phosphatase    ()  U/L


 


Troponin I    (0.000-0.034)  ng/mL


 


Total Protein    (6.3-8.2)  g/dL


 


Albumin    (3.5-5.0)  g/dL


 


Coronavirus (PCR)  Not Detected   (Not Detected)  














- EKG Data


EKG Comments: 


Ventricular rate 97 bpm, ID interval 136 ms, QRS restoration 92 ms, QT/QTC 

344/436 ms.  This is sinus rhythm with a sinus arrhythmia.  There is no ST 

elevation or depression








Disposition


Clinical Impression: 


 Chest pain, Anxiety





Disposition: HOME SELF-CARE


Condition: Good


Instructions (If sedation given, give patient instructions):  Chest Pain (ED), 

Anxiety (ED)


Additional Instructions: 


Please use medication as discussed.  Please follow-up with family doctor in the 

next 2 days of symptoms have not improved.  Please return to emergency room if 

the symptoms increase or worsen or for any other concerns.


Is patient prescribed a controlled substance at d/c from ED?: No


Referrals: 


None,Stated [Primary Care Provider] - 1-2 days


Time of Disposition: 15:06 (uknown discharge, pt signed out to [facility])

## 2020-06-01 NOTE — XR
EXAMINATION TYPE: XR chest 2V

 

DATE OF EXAM: 6/1/2020

 

COMPARISON: 5/4/2020

 

HISTORY: Cough. Chest pain

 

TECHNIQUE: 2 views.

 

FINDINGS:

 Heart and mediastinum are normal. Lungs are clear. Diaphragm is normal. Bony thorax appears normal.

 

 

 

IMPRESSION: Normal chest. No change.

## 2020-06-01 NOTE — ED
General Adult HPI





- General


Chief complaint: Chest Pain


Stated complaint: Anxiety, Vomiting


Time Seen by Provider: 20 20:26


Source: patient, RN notes reviewed


Mode of arrival: ambulatory


Limitations: no limitations





- History of Present Illness


Initial comments: 





24-year-old male with a past medical history of asthma presents to the emergency

department for a chief complaint of "panic attack."  Patient states that he was 

diagnosed with Covid 19 on 2020.  Patient states that his father  a 

couple weeks ago from Covid 19.  Patient states that since that time he has had 

severe panic attacks.  To cause this chest pain feels like his throat was 

closing.  States he gets very shaky and hot.  Patient denies any thoughts of 

harming himself or anyone else.  Denies any thoughts of suicide.  Patient states

he saw his primary care provider last week who put him on Zoloft which has not 

yet helped.  Patient also taking trazodone at night for sleep.Patient has no 

other complaints at this time including shortness of breath, chest pain, 

abdominal pain, nausea or vomiting, headache, or visual changes.





- Related Data


                                  Previous Rx's











 Medication  Instructions  Recorded


 


Azithromycin [Zithromax Z-pack] 250 mg PO AS DIRECTED #1 pack 20











                                    Allergies











Allergy/AdvReac Type Severity Reaction Status Date / Time


 


No Known Allergies Allergy   Verified 20 20:21














Review of Systems


ROS Statement: 


Those systems with pertinent positive or pertinent negative responses have been 

documented in the HPI.





ROS Other: All systems not noted in ROS Statement are negative.





Past Medical History


Past Medical History: No Reported History, Asthma


Additional Past Medical History / Comment(s): covid 19 2020


History of Any Multi-Drug Resistant Organisms: None Reported


Past Surgical History: No Surgical Hx Reported


Past Psychological History: Anxiety


Smoking Status: Former smoker


Past Alcohol Use History: Rare


Past Drug Use History: Marijuana





General Exam


Limitations: no limitations


General appearance: alert, in no apparent distress


Head exam: Present: atraumatic, normocephalic, normal inspection


Eye exam: Present: normal appearance, PERRL, EOMI.  Absent: scleral icterus, 

conjunctival injection, periorbital swelling


ENT exam: Present: normal exam, normal oropharynx (Oropharynx patent), mucous 

membranes moist, TM's normal bilaterally, normal external ear exam


Neck exam: Present: normal inspection, full ROM.  Absent: tenderness, 

meningismus, lymphadenopathy


Respiratory exam: Present: normal lung sounds bilaterally.  Absent: respiratory 

distress, wheezes, rales, rhonchi, stridor


Cardiovascular Exam: Present: regular rate, normal rhythm, normal heart sounds. 

Absent: systolic murmur, diastolic murmur, rubs, gallop, clicks


GI/Abdominal exam: Present: soft, normal bowel sounds.  Absent: distended, 

tenderness, guarding, rebound, rigid


Neurological exam: Present: alert





Course


                                   Vital Signs











  20





  20:14


 


Temperature 99.7 F H


 


Pulse Rate 96


 


Respiratory 22





Rate 


 


Blood Pressure 140/94


 


O2 Sat by Pulse 100





Oximetry 














EKG Findings





- EKG Comments:


EKG Findings:: Sinus tachycardia, ventricular rate 112, [place] interval 138, QRS 

duration 80, QTc 453





Medical Decision Making





- Medical Decision Making





Patient presents very anxious.  Patient is shaking.  Patient complaining of pain

all over.  States his throat feels like it is closing.  Patient clearly having 

an anxiety attack.  I was able to calm patient with talking.  He was given a 

milligram of Ativan.  He had a full workup less than 24 hours ago including 

negative d-dimer and troponin.  Patient was given outpatient resources to [facility] 

and other counseling services.  He is already starting on depression 

medications.  Patient reevaluated and is feeling better although also 

complaining of a sore throat.  Therefore strep was ordered however or of anxious

patient.  Patient will follow-up with primary care and will return here for any 

worsening symptoms.





Disposition


Clinical Impression: 


 Anxiety





Disposition: HOME SELF-CARE


Condition: Fair


Instructions (If sedation given, give patient instructions):  Generalized 

Anxiety Disorder (ED)


Additional Instructions: 


Please follow up with primary care in 1-2 days.  Please return to the emergency 

room for any worsening symptoms.


Is patient prescribed a controlled substance at d/c from ED?: No


Referrals: 


Jerry Kemp MD [STAFF PHYSICIAN] - 1-2 days


Time of Disposition: 22:04

## 2021-05-03 ENCOUNTER — HOSPITAL ENCOUNTER (EMERGENCY)
Dept: HOSPITAL 47 - EC | Age: 26
Discharge: HOME | End: 2021-05-03
Payer: COMMERCIAL

## 2021-05-03 VITALS
SYSTOLIC BLOOD PRESSURE: 140 MMHG | TEMPERATURE: 98.4 F | DIASTOLIC BLOOD PRESSURE: 77 MMHG | RESPIRATION RATE: 18 BRPM | HEART RATE: 107 BPM

## 2021-05-03 DIAGNOSIS — Z23: ICD-10-CM

## 2021-05-03 DIAGNOSIS — S61.211A: Primary | ICD-10-CM
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
## 2021-05-03 DIAGNOSIS — J45.909: ICD-10-CM

## 2021-05-03 PROCEDURE — 99284 EMERGENCY DEPT VISIT MOD MDM: CPT

## 2021-05-03 PROCEDURE — 90471 IMMUNIZATION ADMIN: CPT

## 2021-05-03 PROCEDURE — 90715 TDAP VACCINE 7 YRS/> IM: CPT

## 2021-05-03 NOTE — ED
Wound/Laceration HPI





- General


Chief Complaint: Wound/Laceration


Stated Complaint: Cut finger on glass,Arm pain


Time Seen by Provider: 05/03/21 20:13


Source: patient


Mode of arrival: ambulatory


Limitations: no limitations





- History of Present Illness


Initial Comments: 


25-year-old male patient presents to the emergency department today for 

evaluation of aching and weakness to the left arm and chest.  States that he 

sustained a laceration to the left index finger while working on water heaters 

today.  States he was doing some lifting.  States throughout the day he started 

to feel some aching to the left arm states was over his pectoralis muscles well.

 He was concerned he may developing tightness he came here for further 

evaluation.  Denies any fever or chills.  States injury occurred today.  Denies 

any other injuries or concerns.  Denies any shortness of breath or upper back 

pain.








- Related Data


                                  Previous Rx's











 Medication  Instructions  Recorded


 


Azithromycin [Zithromax Z-pack (6 250 mg PO AS DIRECTED #1 pack 04/27/20





tabs)]  


 


LORazepam [Ativan] 0.5 mg PO TID PRN 3 Days #5 tab 06/01/20











                                    Allergies











Allergy/AdvReac Type Severity Reaction Status Date / Time


 


No Known Allergies Allergy   Verified 05/03/21 19:49














Review of Systems


ROS Statement: 


Those systems with pertinent positive or pertinent negative responses have been 

documented in the HPI.





ROS Other: All systems not noted in ROS Statement are negative.





Past Medical History


Past Medical History: No Reported History, Asthma


Additional Past Medical History / Comment(s): covid 19 4/25/2020


History of Any Multi-Drug Resistant Organisms: None Reported


Past Surgical History: No Surgical Hx Reported


Past Psychological History: Anxiety


Smoking Status: Never smoker


Past Alcohol Use History: Rare


Past Drug Use History: None Reported





General Exam


Limitations: no limitations


General appearance: alert, in no apparent distress


ENT exam: Present: normal exam, normal oropharynx, mucous membranes moist


Respiratory exam: Present: normal lung sounds bilaterally.  Absent: respiratory 

distress, wheezes, rales, rhonchi, stridor


Cardiovascular Exam: Present: regular rate, normal rhythm, normal heart sounds. 

 Absent: systolic murmur, diastolic murmur, rubs, gallop, clicks


Extremities exam: Present: full ROM, normal capillary refill, other (There is 

superficial laceration noted to the left index finger.  Skin is otherwise pink, 

warm, dry.  Cap refill less than 3 seconds.  Radial pulse 2+ and equal 

bilaterally.).  Absent: normal inspection, tenderness, pedal edema, joint 

swelling, calf tenderness


Neurological exam: Present: alert, oriented X3, CN II-XII intact


Psychiatric exam: Present: normal affect, normal mood


Skin exam: Present: warm, dry, intact, normal color.  Absent: rash





Course


                                   Vital Signs











  05/03/21 05/03/21





  19:45 20:37


 


Temperature 98.4 F 98.4 F


 


Pulse Rate 107 H 107 H


 


Respiratory 18 18





Rate  


 


Blood Pressure 140/77 140/77


 


O2 Sat by Pulse 99 99





Oximetry  














Medical Decision Making





- Medical Decision Making


25-year-old male patient presented to the emergency department today for 

evaluation of laceration to the left index finger.  Is reporting left arm 

fatigue and aching.  Physical examination is unremarkable.  He is neurologically

 intact with no focal deficits.  Laceration was superficial and did not need 

repair.  We did update his tetanus vaccine.  He'll be discharged follow up with 

his primary care physician for recheck in 1-2 days.  Return parameters were 

discussed in detail.  He verbalizes understanding and agrees with this plan.  

Case discussed with my attending Dr. Rodríguez.








Disposition


Clinical Impression: 


 Laceration of left index finger





Disposition: HOME SELF-CARE


Condition: Good


Instructions (If sedation given, give patient instructions):  

Diphtheria/Pertussis/Tetanus Vaccine (By injection), Laceration (ED)


Additional Instructions: 


Keep wound clean and dry.  Cleanse twice daily with warm water and antibacterial

 soap.  Up with her primary care physician for recheck in 1-2 days per Return to

 the emergency department immediately for any new, worsening, or concerning 

symptoms.


Is patient prescribed a controlled substance at d/c from ED?: No


Referrals: 


Jerry Kemp MD [Primary Care Provider] - 1-2 days


Time of Disposition: 20:35

## 2023-04-28 ENCOUNTER — HOSPITAL ENCOUNTER (EMERGENCY)
Dept: HOSPITAL 47 - EC | Age: 28
Discharge: HOME | End: 2023-04-28
Payer: COMMERCIAL

## 2023-04-28 VITALS — TEMPERATURE: 98.6 F | DIASTOLIC BLOOD PRESSURE: 87 MMHG | HEART RATE: 88 BPM | SYSTOLIC BLOOD PRESSURE: 134 MMHG

## 2023-04-28 VITALS — RESPIRATION RATE: 18 BRPM

## 2023-04-28 DIAGNOSIS — R51.9: Primary | ICD-10-CM

## 2023-04-28 DIAGNOSIS — F41.9: ICD-10-CM

## 2023-04-28 DIAGNOSIS — Z86.16: ICD-10-CM

## 2023-04-28 DIAGNOSIS — J45.909: ICD-10-CM

## 2023-04-28 PROCEDURE — 96361 HYDRATE IV INFUSION ADD-ON: CPT

## 2023-04-28 PROCEDURE — 96375 TX/PRO/DX INJ NEW DRUG ADDON: CPT

## 2023-04-28 PROCEDURE — 99284 EMERGENCY DEPT VISIT MOD MDM: CPT

## 2023-04-28 PROCEDURE — 70450 CT HEAD/BRAIN W/O DYE: CPT

## 2023-04-28 PROCEDURE — 96374 THER/PROPH/DIAG INJ IV PUSH: CPT

## 2023-04-28 NOTE — CT
EXAMINATION TYPE: CT brain wo con

CT DLP: 1068.4 mGycm, Automated exposure control for dose reduction was used.

 

DATE OF EXAM: 4/28/2023 7:55 PM

 

COMPARISON: None.

 

CLINICAL INDICATION:Male, 27 years old with history of headache, Reoccurring headaches x2wks. No inju
ry.

 

TECHNIQUE: 

Brain: Axial CT images of the brain were obtained with coronal and sagittal reformats created and rev
iewed.

Contrast used: None.

Oral contrast used: None.

 

FINDINGS:

 

Brain:

Extra-axial spaces: No abnormal extra-axial fluid collections.

Ventricular system: Within normal limits

Cerebral parenchyma: No acute intraparenchymal hemorrhage or mass effect.  The gray-white junction is
 well differentiated. 

Cerebellum: Unremarkable.

Mass effect: No evidence of midline shift.

Intracranial vasculature: unremarkable

Soft tissues: Normal.

Calvarium/osseous structures: No depressed skull fracture.

Paranasal sinuses and mastoid air cells: Mild scattered paranasal sinus disease.

Visualized orbits: Orbital contents are intact.

 

 

IMPRESSION:

No acute intracranial process.

## 2023-04-28 NOTE — ED
Headache HPI





- General


Chief Complaint: Headache


Stated Complaint: Headache


Time Seen by Provider: 04/28/23 19:33


Mode of arrival: ambulatory


Limitations: no limitations





- History of Present Illness


Initial Comments: 


Patient is a 27-year-old male who presents to the emergency department for 

headache.  Patient has had consistent headache for the past 2 weeks which waxes 

and wanes from mild to severe.  He denies history of migraines.  Pain is at the 

top of his head.  He also reports intermittent numbness and tingling of the top 

of his head.  He denies recent falls and head trauma.  No fever, chills, cold-

like symptoms, neck pain.  Denies nausea or vomiting.  He does admit to 

intermittent lightheadedness.  No double vision or blurry vision.  No chest pain

or shortness of breath.  Patient was prescribed topiramate rise primary care 

provider which is not working.  He has also been taking Motrin and Tylenol with 

little relief.








- Related Data


                                  Previous Rx's











 Medication  Instructions  Recorded


 


Azithromycin [Zithromax Z-pack (6 250 mg PO AS DIRECTED #1 pack 04/27/20





tabs)]  


 


LORazepam [Ativan] 0.5 mg PO TID PRN 3 Days #5 tab 06/01/20











                                    Allergies











Allergy/AdvReac Type Severity Reaction Status Date / Time


 


No Known Allergies Allergy   Verified 05/03/21 19:49














Review of Systems


ROS Statement: 


Those systems with pertinent positive or pertinent negative responses have been 

documented in the HPI.





ROS Other: All systems not noted in ROS Statement are negative.





Past Medical History


Past Medical History: Asthma


Additional Past Medical History / Comment(s): covid 19 4/25/2020


History of Any Multi-Drug Resistant Organisms: None Reported


Past Surgical History: No Surgical Hx Reported


Past Psychological History: Anxiety


Smoking Status: Never smoker


Past Alcohol Use History: Rare


Past Drug Use History: None Reported





General Exam


Limitations: no limitations


General appearance: alert, in no apparent distress


Head exam: Present: atraumatic, normocephalic, normal inspection


Eye exam: Present: normal appearance, PERRL, EOMI.  Absent: scleral icterus, 

conjunctival injection, periorbital swelling


ENT exam: Present: normal oropharynx, TM's normal bilaterally


Neck exam: Present: normal inspection.  Absent: tenderness, meningismus, 

lymphadenopathy


Respiratory exam: Present: normal lung sounds bilaterally.  Absent: respiratory 

distress, wheezes, rales, rhonchi, stridor


Cardiovascular Exam: Present: regular rate, normal rhythm, normal heart sounds. 

 Absent: systolic murmur, diastolic murmur, rubs, gallop, clicks


GI/Abdominal exam: Present: soft, normal bowel sounds.  Absent: distended, 

tenderness, guarding, rebound, rigid


Neurological exam: Present: alert, oriented X3, CN II-XII intact


  ** Expanded


Speech: Present: fluid speech


Cranial nerves: EOM's Intact: Normal, Facial Sensation: Normal, Facial Palsy 

with Forehead Movement: Normal, Facial Palsy without Forehead Movement: Normal


Cerebellar function: Finger to Nose: Normal, Heel to Shin: Normal, Romberg: 

Normal


Sensory exam: Upper Extremity Pin Prick: Normal, Lower Extremity Light Touch: 

Normal


Motor strength exam: RUE: 5, LUE: 5, RLE: 5, LLE: 5


Psychiatric exam: Present: normal affect, normal mood


Skin exam: Present: warm, dry, intact, normal color.  Absent: rash





Course


                                   Vital Signs











  04/28/23 04/28/23





  19:27 19:45


 


Temperature 98.2 F 99.0 F


 


Pulse Rate 88 97


 


Respiratory 16 18





Rate  


 


Blood Pressure 142/89 141/79


 


O2 Sat by Pulse 100 98





Oximetry  














Medical Decision Making





- Medical Decision Making


Was pt. sent in by a medical professional or institution ([facility], NP, urgent 

care, hospital, or nursing home...) When possible be specific


@  -No


Did you speak to anyone other than the patient for history (EMS, parent, family,

 police, friend...)? What history was obtained from this source 


@  -No


Did you review nursing and triage notes (agree or disagree)?  Why? 


@  -I reviewed and agree with nursing and triage notes


Were old charts reviewed (outside hosp., previous admission, EMS record, old 

EKG, old radiological studies, urgent care reports/EKG's, nursing home records)?

 Report findings 


@  -No old charts were reviewed


Differential Diagnosis (chest pain, altered mental status, abdominal pain women,

 abdominal pain men, vaginal bleeding, weakness, fever, dyspnea, syncope, 

headache, dizziness, GI bleed, back pain, seizure, CVA, palpatations, mental 

health)? 


@  -Differential Headache:


Migraine, tension, cluster, carbon monoxide, central venous thrombosis, pension 

karma temporal arteritis, acute closure glaucoma, intercranial hemorrhage, 

mastoiditis, sinusitis, head injury, this is not meant to be an all-inclusive 

list.


EKG interpreted by me (3pts min.).


@  -As above


X-rays interpreted by me (1pt min.).


@  -None done


CT interpreted by me (1pt min.).


@  -Yes, CT of the brain negative for acute process


U/S interpreted by me (1pt. min.).


@  -None done


What testing was considered but not performed or refused? (CT, X-rays, U/S, 

labs)? Why?


@  -None


What meds were considered but not given or refused? Why?


@  -None


Did you discuss the management of the patient with other professionals 

(professionals i.e. , PA, NP, lab, RT, psych nurse, , , 

teacher, , )? Give summary


@  -No


Was smoking cessation discussed for >3mins.?


@  -No


Was critical care preformed (if so, how long)?


@  -No


Were there social determinants of health that impacted care today? How? 

(Homelessness, low income, unemployed, alcoholism, drug addiction, 

transportation, low edu. Level, literacy, decrease access to med. care, [facility], 

rehab)?


@  -No


Was there de-escalation of care discussed even if they declined (Discuss DNR or 

withdrawal of care, Hospice)? DNR status


@  -No


What co-morbidities impacted this encounter? (DM, HTN, Smoking, COPD, CAD, 

Cancer, CVA, ARF, Chemo, Hep., AIDS, mental health diagnosis, sleep apnea, 

morbid obesity)?


@  -None]


Was patient admitted / discharged? Hospital course, mention meds given and 

route, prescriptions, significant lab abnormalities, going to OR and other 

pertinent info.


@  -Patient presenting with headache.  Due to duration and severity of headache 

CT of the brain was obtained which is negative for acute process.  Patient given

migraine cocktail with significant relief.  He is in stable medical condition 

for discharge.


Undiagnosed new problem with uncertain prognosis?


@  -[No]


Drug Therapy requiring intensive monitoring for toxicity (Heparin, Nitro, 

Insulin, Cardizem)?


@  -[No]


Were any procedures done?


@  -[No]


Diagnosis/symptom?


@  -headache


Acute, or Chronic, or Acute on Chronic?


@  -acute


Uncomplicated (without systemic symptoms) or Complicated (systemic symptoms)?


@  -uncomplicated


Side effects of treatment?


@  -[No]


Exacerbation, Progression, or Severe Exacerbation?


@  -[No]


Poses a threat to life or bodily function? How? (Chest pain, USA, MI, pneumonia,

 PE, COPD, DKA, ARF, appy, cholecystitis, CVA, Diverticulitis, Homicidal, 

Suicidal, threat to staff... and all critical care pts)


@  -[No]








Dr. Treadwell is my attending 





Disposition


Clinical Impression: 


 Headache





Disposition: HOME SELF-CARE


Condition: Good


Instructions (If sedation given, give patient instructions):  Acute Headache 

(ED)


Additional Instructions: 


Follow-up with primary care 1-2 days.  Return to emergency department if you 

experience new, concerning for worsening symptoms.


Is patient prescribed a controlled substance at d/c from ED?: No


Referrals: 


Radha Han DO [Primary Care Provider] - 1-2 days

## 2023-07-17 ENCOUNTER — HOSPITAL ENCOUNTER (OUTPATIENT)
Dept: HOSPITAL 47 - RADMRIMAIN | Age: 28
Discharge: HOME | End: 2023-07-17
Attending: FAMILY MEDICINE
Payer: COMMERCIAL

## 2023-07-17 DIAGNOSIS — R90.82: ICD-10-CM

## 2023-07-17 DIAGNOSIS — G43.909: Primary | ICD-10-CM

## 2023-07-17 DIAGNOSIS — G35: ICD-10-CM

## 2023-07-17 PROCEDURE — 70553 MRI BRAIN STEM W/O & W/DYE: CPT

## 2023-07-18 NOTE — MR
EXAMINATION TYPE: MR brain wo/w con

 

DATE OF EXAM: 7/17/2023 9:22 PM

 

CLINICAL INDICATION:Male, 28 years old with history of G43.909; Headaches and dizziness

 

COMPARISON: 4/20/2023

 

TECHNIQUE: Multi planar, multi sequence imaging was performed through the brain including: T1, T2, In
version recovery, susceptibility weighted imaging and gradient echo imaging and Diffusion weighted im
aging. The patient was then given intravenous contrast and multi planar, T1 fat-saturation images wer
e obtained.

IV Contrast: 6 cc Gadavist

 

FINDINGS: 

 

The gray-white junctions, ventricular system, basal cisterns appear unremarkable. Diffusion-weighted 
imaging shows no evidence of restricted diffusion to suggest acute/subacute infarct. Intracranial art
erial flow voids are maintained. Midline structures show no abnormality. Scattered foci of high T2 si
gnal intensity are seen within the periventricular white matter which are non the typical pattern of 
multiple sclerosis. These are not orthogonal to the lateral ventricles. The susceptibility weighted i
mages do not reveal any evidence for micro-hemorrhage. After administration of gadolinium, no abnorma
l enhancement is seen.

 

The bone marrow signal is within normal limits. 

Paranasal sinuses and mastoid air cells: Mild scattered paranasal sinus disease.

Visualized orbits: Orbital contents are intact.

 

IMPRESSION:

1. No evidence of intracranial mass, acute/subacute infarct, or abnormal enhancement.

2. Scattered Nonspecific white matter changes, could represent sequela of migraines. These are non th
e typical pattern of multiple sclerosis. No evidence for active demyelination.

## 2025-01-06 ENCOUNTER — HOSPITAL ENCOUNTER (EMERGENCY)
Dept: HOSPITAL 47 - EC | Age: 30
Discharge: HOME | End: 2025-01-06
Payer: COMMERCIAL

## 2025-01-06 VITALS — RESPIRATION RATE: 18 BRPM | TEMPERATURE: 98.1 F

## 2025-01-06 VITALS — HEART RATE: 81 BPM | SYSTOLIC BLOOD PRESSURE: 150 MMHG | DIASTOLIC BLOOD PRESSURE: 99 MMHG

## 2025-01-06 DIAGNOSIS — F17.290: ICD-10-CM

## 2025-01-06 DIAGNOSIS — Z11.52: ICD-10-CM

## 2025-01-06 DIAGNOSIS — Z86.16: ICD-10-CM

## 2025-01-06 DIAGNOSIS — M54.50: Primary | ICD-10-CM

## 2025-01-06 LAB
PH UR: 6.5 [PH] (ref 5–8)
RBC UR QL: <1 /HPF (ref 0–5)
SP GR UR: 1.02 (ref 1–1.03)
SQUAMOUS UR QL AUTO: <1 /HPF (ref 0–4)
UROBILINOGEN UR QL STRIP: <2 MG/DL (ref ?–2)
WBC #/AREA URNS HPF: <1 /HPF (ref 0–5)

## 2025-01-06 PROCEDURE — 96372 THER/PROPH/DIAG INJ SC/IM: CPT

## 2025-01-06 PROCEDURE — 74018 RADEX ABDOMEN 1 VIEW: CPT

## 2025-01-06 PROCEDURE — 81001 URINALYSIS AUTO W/SCOPE: CPT

## 2025-01-06 PROCEDURE — 99284 EMERGENCY DEPT VISIT MOD MDM: CPT

## 2025-01-06 PROCEDURE — 87636 SARSCOV2 & INF A&B AMP PRB: CPT

## 2025-01-06 RX ADMIN — LIDOCAINE ONE PATCH: 4 PATCH TOPICAL at 03:07

## 2025-01-06 RX ADMIN — KETOROLAC TROMETHAMINE STA MG: 15 INJECTION, SOLUTION INTRAMUSCULAR; INTRAVENOUS at 01:15

## 2025-01-06 NOTE — XR
EXAM:

  XR Abdomen, 1 View

 

CLINICAL HISTORY:

  ITS.REASON XR Reason: L flank pain

 

TECHNIQUE:

  Frontal supine view of the abdomen/pelvis.

 

COMPARISON:

  No relevant prior studies available.

 

IMPRESSION:     

1.  Evaluation for free or limited by supine imaging.

2.  No evidence of bowel obstruction.

3.  No visualized radiopaque renal calculi.  Consider cross-sectional 

imaging if there is further concern.

## 2025-01-06 NOTE — ED
General Adult HPI





- General


Chief complaint: Back Pain/Injury


Stated complaint: ABD Pain


Time Seen by Provider: 01/06/25 00:52


Source: patient, RN notes reviewed


Mode of arrival: ambulatory


Limitations: no limitations





- History of Present Illness


Initial comments: 


This is a 29-year-old male with no significant medical history presenting to the

emergency department for complaint of left flank pain that has been intermittent

over the past 6 days.  Patient states that the pain is worse with movement and 

on pressure and mildly radiates around into the front of his abdomen.  He denies

radiation into bilateral lower extremities, paresthesias.  He denies hematuria, 

dysuria, increased urinary frequency or urgency, abdominal pain.  He endorses 

headache, nausea, generalized bodyaches.  Patient falls, heavy lifting, injuries

to the lower back.  States that he has taken Tylenol today with minimal relief 

of symptoms.








- Related Data


                                  Previous Rx's











 Medication  Instructions  Recorded


 


Azithromycin [Zithromax Z-pack (6 250 mg PO AS DIRECTED #1 pack 04/27/20





tabs)]  


 


LORazepam [Ativan] 0.5 mg PO TID PRN 3 Days #5 tab 06/01/20


 


Ibuprofen [Motrin] 800 mg PO Q6HR #30 tab 01/06/25


 


Lidocaine 5% Patch [Lidoderm] 1 patch TOPICAL DAILY #12 patch 01/06/25











                                    Allergies











Allergy/AdvReac Type Severity Reaction Status Date / Time


 


No Known Allergies Allergy   Verified 01/06/25 00:50














Review of Systems


ROS Statement: 


Those systems with pertinent positive or pertinent negative responses have been 

documented in the HPI.





ROS Other: All systems not noted in ROS Statement are negative.





Past Medical History


Past Medical History: Asthma


Additional Past Medical History / Comment(s): covid 19 4/25/2020


History of Any Multi-Drug Resistant Organisms: None Reported


Past Surgical History: No Surgical Hx Reported


Past Psychological History: Anxiety


Smoking Status: Vaper


Past Alcohol Use History: Rare


Past Drug Use History: None Reported





General Exam


Limitations: no limitations


General appearance: alert, in no apparent distress


ENT exam: Present: normal exam, mucous membranes moist


Neck exam: Present: normal inspection.  Absent: tenderness, meningismus, 

lymphadenopathy


Respiratory exam: Present: normal lung sounds bilaterally.  Absent: respiratory 

distress, wheezes, rales, rhonchi, stridor


Cardiovascular Exam: Present: normal rhythm, tachycardia, normal heart sounds.  

Absent: systolic murmur, diastolic murmur, rubs, gallop, clicks


GI/Abdominal exam: Present: soft, tenderness (midl left sided pain to 

palpation), normal bowel sounds.  Absent: distended, guarding, rebound, rigid


Extremities exam: Present: normal inspection, full ROM, normal capillary refill.

  Absent: tenderness, pedal edema, joint swelling, calf tenderness


Back exam: Present: normal inspection, full ROM, CVA tenderness (L).  Absent: 

CVA tenderness (R), paraspinal tenderness, vertebral tenderness, rash noted


Skin exam: Present: warm, dry, intact, normal color.  Absent: rash





Course


                                   Vital Signs











  01/06/25 01/06/25





  00:48 02:55


 


Temperature 98.1 F 


 


Pulse Rate 121 H 81


 


Respiratory 18 18





Rate  


 


Blood Pressure 155/104 150/99


 


O2 Sat by Pulse 100 99





Oximetry  














Medical Decision Making





- Medical Decision Making


Was pt. sent in by a medical professional or institution (, PA, NP, urgent 

care, hospital, or nursing home...) When possible be specific


@ -No


Did you speak to anyone other than the patient for history (EMS, parent, family,

 police, friend...)? What history was obtained from this source 


@ -No


Did you review nursing and triage notes (agree or disagree)? Why? 


@ -I reviewed and agree with nursing and triage notes


Were old charts reviewed (outside hosp., previous admission, EMS record, old 

EKG, old radiological studies, urgent care reports/EKG's, nursing home records)?

 Report findings 


@ -No old charts were reviewed


Differential Diagnosis (chest pain, altered mental status, abdominal pain women,

 abdominal pain men, vaginal bleeding, weakness, fever, dyspnea, syncope, 

headache, dizziness, GI bleed, back pain, seizure, CVA, palpatations, mental 

health, musculoskeletal)? 


@ -Differential Back Pain:


Strain, zoster, cauda equina syndrome, epidural abscess, vertebral 

osteomyelitis, discitis, fracture, subluxation, disc herniation, DJD, spinal 

stenosis, dissection, AAA, pancreatitis, peptic ulcer disease, pyelonephritis, 

kidney stone, this is not meant to be an all-inclusive list.





EKG interpreted by me (3pts min.).


@ -none


XR interpreted by me(1pt min.).


@ - KUB x-ray reveals no evidence of bowel obstruction or radiopaque renal 

calculi


@ -None done


CT interpreted by me (1pt min.).


@ -None done


U/S interpreted by me (1pt. min.).


@ -None done


What testing was considered but not performed or refused? (CT, X-rays, U/S, 

labs)? Why?


@ -None


What meds were considered but not given or refused? Why?


@ -None


Did you discuss the management of the patient with other professionals 

(professionals i.e. Dr., PA, NP, lab, RT, psych nurse, , , 

teacher, , )? Give summary


@ -No


Was smoking cessation discussed for >3mins.?


@ -No


Was critical care preformed (if so, how long)?


@ -No


Were there social determinants of health that impacted care today? How? (H

omelessness, low income, unemployed, alcoholism, drug addiction, transportation,

 low edu. Level, literacy, decrease access to med. care, care home, rehab)?


@ -No


Was there de-escalation of care discussed even if they declined (Discuss DNR or 

withdrawal of care, Hospice)? DNR status


@ -No


What co-morbidities impacted this encounter? (DM, HTN, Smoking, COPD, CAD, 

Cancer, CVA, ARF, Chemo, Hep., AIDS, mental health diagnosis, sleep apnea, 

morbid obesity)?


@ -None


Was patient admitted / discharged? Hospital course, mention meds given and 

route, prescriptions, significant lab abnormalities, going to OR and other 

pertinent info.


@ -Discharge.  29-year-old male presenting with left flank pain that is 

exacerbated palpation with range of motion.  There are no overlying skin 

changes.  Vitals are stable.  Patient was evaluated via urinalysis, KUB x-ray 

and viral swab with concern for URI symptoms.  Additionally, patient is provided

 with analgesics.  Urinalysis, viral swab, x-rays unremarkable.  On reevaluation

 after medication ministration patient states that his symptoms have improved.  

He is discharged with prescription for Lidoderm patches and ibuprofen.  

Discussed with Dr. Treadwell


Undiagnosed new problem with uncertain prognosis?


@ -No


Drug Therapy requiring intensive monitoring for toxicity (Heparin, Nitro, 

Insulin, Cardizem)?


@ -No


Were any procedures done?


@ -No


Diagnosis/symptom?


@ -back pain


Acute, or Chronic, or Acute on Chronic?


@ -acute


Uncomplicated (without systemic symptoms) or Complicated (systemic symptoms)?


@ -uncomplicated


Side effects of treatment?


@ -No


Exacerbation, Progression, or Severe Exacerbation?


@ -No


Poses a threat to life or bodily function? How? (Chest pain, USA, MI, pneumonia,

 PE, COPD, DKA, ARF, appy, cholecystitis, CVA, Diverticulitis, Homicidal, Suici

adryan, threat to staff... and all critical care pts)


@ -No








- Lab Data


                                   Lab Results











  01/06/25 01/06/25 Range/Units





  01:09 01:09 


 


Urine Color  Light Yellow   


 


Urine Appearance  Cloudy   (Clear)  


 


Urine pH  6.5   (5.0-8.0)  


 


Ur Specific Gravity  1.016   (1.001-1.035)  


 


Urine Protein  Negative   (Negative)  


 


Urine Glucose (UA)  Negative   (Negative)  


 


Urine Ketones  Negative   (Negative)  


 


Urine Blood  Negative   (Negative)  


 


Urine Nitrite  Negative   (Negative)  


 


Urine Bilirubin  Negative   (Negative)  


 


Urine Urobilinogen  <2.0   (<2.0)  mg/dL


 


Ur Leukocyte Esterase  Negative   (Negative)  


 


Urine RBC  <1   (0-5)  /hpf


 


Urine WBC  <1   (0-5)  /hpf


 


Ur Squamous Epith Cells  <1   (0-4)  /hpf


 


Urine Bacteria  Rare H   (None)  /hpf


 


Urine Mucus  Rare H   (None)  /hpf


 


Influenza Type A (PCR)   Not Detected  (Not Detectd)  


 


Influenza Type B (PCR)   Not Detected  (Not Detectd)  


 


RSV (PCR)   Not Detected  (Not Detectd)  


 


SARS-CoV-2 (PCR)   Not Detected  (Not Detectd)  














Disposition


Clinical Impression: 


 Back pain





Disposition: HOME SELF-CARE


Condition: Stable


Instructions (If sedation given, give patient instructions):  Back Pain (ED), 

Lower Back Exercises (ED)


Additional Instructions: 


Please return to the Emergency Department if symptoms worsen or any other 

concerns.


Prescriptions: 


Lidocaine 5% Patch [Lidoderm] 1 patch TOPICAL DAILY #12 patch


Ibuprofen [Motrin] 800 mg PO Q6HR #30 tab


Is patient prescribed a controlled substance at d/c from ED?: No


Referrals: 


Nancy Kemp DO [Primary Care Provider] - 1-2 days


Time of Disposition: 03:24